# Patient Record
Sex: MALE | Race: WHITE | NOT HISPANIC OR LATINO | URBAN - METROPOLITAN AREA
[De-identification: names, ages, dates, MRNs, and addresses within clinical notes are randomized per-mention and may not be internally consistent; named-entity substitution may affect disease eponyms.]

---

## 2017-08-04 ENCOUNTER — OFFICE VISIT - HEALTHEAST (OUTPATIENT)
Dept: FAMILY MEDICINE | Facility: CLINIC | Age: 57
End: 2017-08-04

## 2017-08-04 DIAGNOSIS — N18.3 CHRONIC KIDNEY DISEASE (CKD), STAGE 3 (MODERATE): ICD-10-CM

## 2017-08-04 DIAGNOSIS — B19.20 HEPATITIS C: ICD-10-CM

## 2017-08-04 DIAGNOSIS — I10 ESSENTIAL HYPERTENSION: ICD-10-CM

## 2017-08-04 DIAGNOSIS — F15.10 METHAMPHETAMINE ABUSE (H): ICD-10-CM

## 2017-08-04 DIAGNOSIS — F17.200 TOBACCO USE DISORDER: ICD-10-CM

## 2017-08-04 DIAGNOSIS — E78.5 HYPERLIPIDEMIA: ICD-10-CM

## 2017-08-04 DIAGNOSIS — E03.9 HYPOTHYROIDISM, UNSPECIFIED TYPE: ICD-10-CM

## 2017-08-04 DIAGNOSIS — I95.1 ORTHOSTATIC HYPOTENSION: ICD-10-CM

## 2017-08-04 ASSESSMENT — MIFFLIN-ST. JEOR: SCORE: 1440.72

## 2021-05-31 VITALS — BODY MASS INDEX: 24.59 KG/M2 | HEIGHT: 66 IN | WEIGHT: 153 LBS

## 2021-06-12 NOTE — PROGRESS NOTES
Assessment:    1. Orthostatic hypotension     2. Insulin dependent diabetes mellitus     3. Essential hypertension     4. Hypothyroidism, unspecified type     5. Methamphetamine abuse     6. Chronic kidney disease (CKD), stage 3 (moderate)     7. Hyperlipidemia     8. Tobacco use disorder     9. Hepatitis C            Plan:    Patient seen for second opinion.  New patient to this office.  45 minutes total time with patient, > 50% with counseling and coordination of cares.  Reviewed findings consistent with orthostatic hypotension.  Recent discontinuation of amlodipine 10 mg daily.  Continues lisinopril hydrochlorothiazide 20/25 using 1 tablet daily which he had taken this morning.  Blood pressure 132/80 with pulse of 84 while sitting.  Blood pressure 112/70 with pulse 88 after standing 1 minute.  Did instruct patient to decrease lisinopril hydrochlorothiazide from 20/25 down to 10/12.5 daily.  Patient does have scheduled follow-up with primary care provider on Monday through the [facility].  Did reviewed recent findings consistent with chronic kidney disease stage III with creatinine 1.61 and GFR 44.  Borderline low cortisol level at 5.2 on July 25, 2017.  Suppressed TSH, mild, with TSH 0.25 and did decrease levothyroxine from 175 mcg down to 150 mcg daily.  Patient continues remainder of home medication.  Did discuss recent relapse and methamphetamine use after sobriety described ×4 years.  Reviewed long-standing diabetes, insulin-dependent diagnosed ×30 years currently on Tresiba 30 units daily plus NovoLog sliding scale.  No evidence for hypoglycemia.  Recent A1c of 7.5% June 20, 2017.  Would consider endocrinology referral to further evaluate for potential autonomic dysfunction with long-standing diabetes versus adrenal insufficiency versus other.  Also consider cardiac evaluation regarding orthostatic findings if persistent concern.  Doubt primary CNS concern regarding CVA with normal neurologic  exam however would consider MRI MRA brain and neck if persistent issues despite medication dose adjustments as outlined.  Continue to encourage ongoing sobriety from methamphetamine use.  States has received Harvoni treatment for hepatitis C management.  No further lab monitoring indicated at this time however close follow-up with PCP, Chayo Wagoner MD, needed.        Subjective:    Haroldo Barlow is seen today for a second opinion.  Ongoing concerns with dizziness described as onset around July 4, 2017 after eating a chicken sandwich at [facility].  Developed diarrhea lasting approximately 2 days.  Seen through emergency department.  Received 2 bags of IV fluids apparently.  Felt better that night however next day had continued concerns with dizziness with position change especially standing.  Diarrhea subsequently resolved.  No abdominal cramping.  Denies chest pain.  No palpitations or shortness of breath.  No fevers or chills.  Past medical social family history reviewed.  Comprehensive review of systems as above otherwise all negative.  Continues to smoke 1 pack per day ×40 years.  History of prior methamphetamine abuse otherwise had been sober ×4 years up until a week or 2 ago.  Has resumed sobriety since.  Continues Tresiba 30 units daily plus NovoLog sliding scale with diabetes diagnosed ×30 years.  Recent suppressed TSH of 0.25 and did decrease levothyroxine from 175 mcg down to 150 mcg daily.  Had been instructed to discontinue amlodipine 10 mg daily however continues lisinopril hydrochlorothiazide 20/25 1 tablet daily.  Remainder of home medications including simvastatin 10 mg at bedtime continue.  History of chronic kidney disease stage III with prior creatinine 1.61 and GFR 44 otherwise recent CBC, sed rate, CRP, hemogram and cortisol levels normal.  Cortisol 5.2 on July 25, 2017 which borderline low however.  No history of adrenal insufficiency.    History reviewed. No pertinent surgical  "history.     History reviewed. No pertinent family history.     History reviewed. No pertinent past medical history.     Social History   Substance Use Topics     Smoking status: Current Every Day Smoker     Smokeless tobacco: Never Used     Alcohol use No        Current Outpatient Prescriptions   Medication Sig Dispense Refill     FLOVENT  mcg/actuation inhaler INHALE 1 PUFF PO BID  3     insulin aspart protamine-insulin aspart (NOVOLOG/HUMALOG 70-30 FLEXPEN) 100 unit/mL (70-30) pen Inject 4 Units under the skin 2 (two) times a day before meals.       levothyroxine (SYNTHROID, LEVOTHROID) 150 MCG tablet TK ONE T PO BEFORE BREAKFAST  1     lisinopril-hydrochlorothiazide (PRINZIDE,ZESTORETIC) 20-25 mg per tablet TK 1 T PO D  1     omeprazole (PRILOSEC) 20 MG capsule TK 1 C PO QD AC  2     simvastatin (ZOCOR) 10 MG tablet TK 1 T PO QHS  0     TRESIBA FLEXTOUCH U-100 100 unit/mL (3 mL) InPn INJECT 30 UNITS SUBCUTANEOUSLY Q 24 HOURS  1     VENTOLIN HFA 90 mcg/actuation inhaler INL 2 PFS PO QID PRF SOB OR WHZ  0     No current facility-administered medications for this visit.           Objective:    Vitals:    08/04/17 1215   BP: 124/78   Pulse: 88   Weight: 153 lb (69.4 kg)   Height: 5' 6.25\" (1.683 m)      Body mass index is 24.51 kg/(m^2).    Alert.  No apparent distress.  HEENT exam with noted poor dentition.  Oropharynx normal otherwise with moist mucous membranes.  Neck supple.  No JVD.  Chest clear.  Cardiac exam regular rate and rhythm.  No cardiac ectopy or murmur.  Abdomen benign.  No abdominal mass.  No guarding or rebound.  Extremities warm and dry.  Appropriate skin turgor.  Neurologic exam today appears nonfocal with negative Romberg.  No evidence for ataxia.  Mild psychomotor agitation.  No active psychoses.      XR CHEST 2 VIEWS PA AND LATERAL  7/25/2017 12:24 PM    INDICATION: Fatigue.  COMPARISON: 11/18/2016    FINDINGS: Lungs clear.  Pulmonary vascularity normal.  No effusion.        ECHO " COMPLETE WO CONTRAST MACIEL SAMUEL 10/13/2016 10:18     [facility] [place] [address]   [phone] [email or web address]                                                 Transthoracic Echo Report   MACIEL SAMUEL ID: [id number] Age: 55 : 1960 Ordering Provider: FLAQUITA BUCKNER   Exam Date: 10/13/2016 10:18 Gender: M Sonographer: JUDITH   Accession #: [id number] Height: 67 in BSA: 1.89 m  BP: 167 / 88   Weight: 171 lbs BMI: 26.8 kg/m  HR: 66   Location: Outpatient Rhythm: Normal Sinus Rhythm   Procedure Components: 2D imaging, Color Doppler, Spectral Doppler   Indications: Hemoptysis   Technical Quality: Adequate Contrast: None       Final Conclusion   Normal left ventricular size. Normal left ventricular systolic function. No regional wall   motion abnormalities.  Normal left ventricular   wall thickness. The ejection fraction is calculated at 61%.     Abnormal relaxation filling pattern of the left ventricle for age (stage 1 diastolic   dysfunction).     The right ventricle is normal in size and function.     Mild left atrial dilatation.     No significant valvular heart disease noted.     There were no prior studies available for comparison.       Estimated EF: 60-65%    CONCLUSION: Negative chest. No change.          [facility]  CT CHEST W  2016 2:29 PM    INDICATION: Cough with hemoptysis.  TECHNIQUE: Routine chest. Dose reduction techniques were used.   IV CONTRAST: Omnipaque 350, 100 mL.  COMPARISON: Chest x-ray 2016.    FINDINGS:   LUNGS AND PLEURA: Mild emphysematous change. Few scattered strands of fibrosis in both lungs. No nodules or masses. No effusions.    MEDIASTINUM: No abnormal mediastinal or hilar lymphadenopathy. Coronary artery calcification.    LIMITED UPPER ABDOMEN: Negative.    MUSCULOSKELETAL: Negative.    CONCLUSION:   1.  Emphysematous change in both lungs. Otherwise negative. Nothing  to specifically explain the patient's hemoptysis.    2.  Coronary artery calcification.